# Patient Record
Sex: MALE | NOT HISPANIC OR LATINO | Employment: OTHER | URBAN - METROPOLITAN AREA
[De-identification: names, ages, dates, MRNs, and addresses within clinical notes are randomized per-mention and may not be internally consistent; named-entity substitution may affect disease eponyms.]

---

## 2018-07-06 ENCOUNTER — OFFICE VISIT - HEALTHEAST (OUTPATIENT)
Dept: FAMILY MEDICINE | Facility: CLINIC | Age: 38
End: 2018-07-06

## 2018-07-06 DIAGNOSIS — Z13.1 SCREENING FOR DIABETES MELLITUS: ICD-10-CM

## 2018-07-06 DIAGNOSIS — A04.8 H. PYLORI INFECTION: ICD-10-CM

## 2018-07-06 DIAGNOSIS — K29.80 DUODENITIS: ICD-10-CM

## 2018-07-06 DIAGNOSIS — R10.10 UPPER ABDOMINAL PAIN: ICD-10-CM

## 2018-07-06 DIAGNOSIS — F41.9 ANXIETY: ICD-10-CM

## 2018-07-06 DIAGNOSIS — Z13.220 LIPID SCREENING: ICD-10-CM

## 2018-07-06 LAB
ALBUMIN SERPL-MCNC: 4.3 G/DL (ref 3.5–5)
ALP SERPL-CCNC: 53 U/L (ref 45–120)
ALT SERPL W P-5'-P-CCNC: 23 U/L (ref 0–45)
ANION GAP SERPL CALCULATED.3IONS-SCNC: 5 MMOL/L (ref 5–18)
AST SERPL W P-5'-P-CCNC: 20 U/L (ref 0–40)
BASOPHILS # BLD AUTO: 0 THOU/UL (ref 0–0.2)
BASOPHILS NFR BLD AUTO: 1 % (ref 0–2)
BILIRUB SERPL-MCNC: 0.6 MG/DL (ref 0–1)
BUN SERPL-MCNC: 15 MG/DL (ref 8–22)
CALCIUM SERPL-MCNC: 9.5 MG/DL (ref 8.5–10.5)
CHLORIDE BLD-SCNC: 105 MMOL/L (ref 98–107)
CO2 SERPL-SCNC: 32 MMOL/L (ref 22–31)
CREAT SERPL-MCNC: 0.84 MG/DL (ref 0.7–1.3)
EOSINOPHIL # BLD AUTO: 0.1 THOU/UL (ref 0–0.4)
EOSINOPHIL NFR BLD AUTO: 1 % (ref 0–6)
ERYTHROCYTE [DISTWIDTH] IN BLOOD BY AUTOMATED COUNT: 11.2 % (ref 11–14.5)
GFR SERPL CREATININE-BSD FRML MDRD: >60 ML/MIN/1.73M2
GLUCOSE BLD-MCNC: 100 MG/DL (ref 70–125)
HCT VFR BLD AUTO: 44.3 % (ref 40–54)
HGB BLD-MCNC: 14.6 G/DL (ref 14–18)
LYMPHOCYTES # BLD AUTO: 1.7 THOU/UL (ref 0.8–4.4)
LYMPHOCYTES NFR BLD AUTO: 24 % (ref 20–40)
MCH RBC QN AUTO: 28.6 PG (ref 27–34)
MCHC RBC AUTO-ENTMCNC: 33 G/DL (ref 32–36)
MCV RBC AUTO: 87 FL (ref 80–100)
MONOCYTES # BLD AUTO: 0.3 THOU/UL (ref 0–0.9)
MONOCYTES NFR BLD AUTO: 5 % (ref 2–10)
NEUTROPHILS # BLD AUTO: 4.9 THOU/UL (ref 2–7.7)
NEUTROPHILS NFR BLD AUTO: 70 % (ref 50–70)
PLATELET # BLD AUTO: 150 THOU/UL (ref 140–440)
PMV BLD AUTO: 8.3 FL (ref 7–10)
POTASSIUM BLD-SCNC: 3.7 MMOL/L (ref 3.5–5)
PROT SERPL-MCNC: 7.1 G/DL (ref 6–8)
RBC # BLD AUTO: 5.11 MILL/UL (ref 4.4–6.2)
SODIUM SERPL-SCNC: 142 MMOL/L (ref 136–145)
URATE SERPL-MCNC: 2.7 MG/DL (ref 3–8)
WBC: 7.1 THOU/UL (ref 4–11)

## 2018-07-06 ASSESSMENT — MIFFLIN-ST. JEOR: SCORE: 1763.11

## 2018-07-07 ENCOUNTER — AMBULATORY - HEALTHEAST (OUTPATIENT)
Dept: LAB | Facility: CLINIC | Age: 38
End: 2018-07-07

## 2018-07-07 ENCOUNTER — COMMUNICATION - HEALTHEAST (OUTPATIENT)
Dept: TELEHEALTH | Facility: CLINIC | Age: 38
End: 2018-07-07

## 2018-07-07 DIAGNOSIS — A04.8 H. PYLORI INFECTION: ICD-10-CM

## 2018-07-07 DIAGNOSIS — Z13.220 LIPID SCREENING: ICD-10-CM

## 2018-07-07 LAB
CHOLEST SERPL-MCNC: 150 MG/DL
FASTING STATUS PATIENT QL REPORTED: YES
HDLC SERPL-MCNC: 39 MG/DL
LDLC SERPL CALC-MCNC: 94 MG/DL
TRIGL SERPL-MCNC: 86 MG/DL

## 2018-07-10 LAB
H PYLORI AG STL QL IA: NORMAL
REPORT STATUS: NORMAL
SPECIMEN DESCRIPTION: NORMAL

## 2018-07-12 ENCOUNTER — COMMUNICATION - HEALTHEAST (OUTPATIENT)
Dept: FAMILY MEDICINE | Facility: CLINIC | Age: 38
End: 2018-07-12

## 2018-07-12 ENCOUNTER — OFFICE VISIT - HEALTHEAST (OUTPATIENT)
Dept: FAMILY MEDICINE | Facility: CLINIC | Age: 38
End: 2018-07-12

## 2018-07-12 ENCOUNTER — COMMUNICATION - HEALTHEAST (OUTPATIENT)
Dept: INTERNAL MEDICINE | Facility: CLINIC | Age: 38
End: 2018-07-12

## 2018-07-12 DIAGNOSIS — R10.13 EPIGASTRIC PAIN: ICD-10-CM

## 2018-07-12 DIAGNOSIS — R55 VASO VAGAL EPISODE: ICD-10-CM

## 2018-07-13 ENCOUNTER — OFFICE VISIT - HEALTHEAST (OUTPATIENT)
Dept: FAMILY MEDICINE | Facility: CLINIC | Age: 38
End: 2018-07-13

## 2018-07-13 DIAGNOSIS — R10.9 GASTRIC PAIN: ICD-10-CM

## 2018-07-23 ENCOUNTER — RECORDS - HEALTHEAST (OUTPATIENT)
Dept: ADMINISTRATIVE | Facility: OTHER | Age: 38
End: 2018-07-23

## 2018-07-24 ENCOUNTER — AMBULATORY - HEALTHEAST (OUTPATIENT)
Dept: FAMILY MEDICINE | Facility: CLINIC | Age: 38
End: 2018-07-24

## 2018-07-24 DIAGNOSIS — R10.10 UPPER ABDOMINAL PAIN: ICD-10-CM

## 2018-07-24 DIAGNOSIS — A04.8 H. PYLORI INFECTION: ICD-10-CM

## 2018-07-24 DIAGNOSIS — K29.80 DUODENITIS: ICD-10-CM

## 2018-07-25 ENCOUNTER — OFFICE VISIT - HEALTHEAST (OUTPATIENT)
Dept: FAMILY MEDICINE | Facility: CLINIC | Age: 38
End: 2018-07-25

## 2018-07-25 DIAGNOSIS — R10.10 UPPER ABDOMINAL PAIN: ICD-10-CM

## 2018-07-25 DIAGNOSIS — A04.8 H. PYLORI INFECTION: ICD-10-CM

## 2018-07-25 DIAGNOSIS — K29.80 DUODENITIS: ICD-10-CM

## 2018-07-25 DIAGNOSIS — F41.9 ANXIETY: ICD-10-CM

## 2019-04-22 ENCOUNTER — HOSPITAL ENCOUNTER (EMERGENCY)
Facility: CLINIC | Age: 39
Discharge: HOME | End: 2019-04-23
Attending: EMERGENCY MEDICINE | Admitting: EMERGENCY MEDICINE
Payer: MEDICAID

## 2019-04-22 ENCOUNTER — APPOINTMENT (OUTPATIENT)
Dept: ULTRASOUND IMAGING | Facility: CLINIC | Age: 39
End: 2019-04-22
Attending: EMERGENCY MEDICINE
Payer: MEDICAID

## 2019-04-22 DIAGNOSIS — R10.13 ABDOMINAL PAIN, EPIGASTRIC: ICD-10-CM

## 2019-04-22 DIAGNOSIS — R11.0 NAUSEA: ICD-10-CM

## 2019-04-22 LAB
ALBUMIN SERPL-MCNC: 3.6 G/DL (ref 3.4–5)
ALP SERPL-CCNC: 59 U/L (ref 40–150)
ALT SERPL W P-5'-P-CCNC: 52 U/L (ref 0–70)
ANION GAP SERPL CALCULATED.3IONS-SCNC: 4 MMOL/L (ref 3–14)
AST SERPL W P-5'-P-CCNC: 25 U/L (ref 0–45)
BASOPHILS # BLD AUTO: 0 10E9/L (ref 0–0.2)
BASOPHILS NFR BLD AUTO: 0.6 %
BILIRUB DIRECT SERPL-MCNC: 0.1 MG/DL (ref 0–0.2)
BILIRUB SERPL-MCNC: 0.4 MG/DL (ref 0.2–1.3)
BUN SERPL-MCNC: 13 MG/DL (ref 7–30)
CALCIUM SERPL-MCNC: 8.7 MG/DL (ref 8.5–10.1)
CHLORIDE SERPL-SCNC: 102 MMOL/L (ref 94–109)
CO2 SERPL-SCNC: 30 MMOL/L (ref 20–32)
CREAT SERPL-MCNC: 0.93 MG/DL (ref 0.66–1.25)
DIFFERENTIAL METHOD BLD: NORMAL
EOSINOPHIL # BLD AUTO: 0.1 10E9/L (ref 0–0.7)
EOSINOPHIL NFR BLD AUTO: 1 %
ERYTHROCYTE [DISTWIDTH] IN BLOOD BY AUTOMATED COUNT: 12.7 % (ref 10–15)
GFR SERPL CREATININE-BSD FRML MDRD: >90 ML/MIN/{1.73_M2}
GLUCOSE SERPL-MCNC: 105 MG/DL (ref 70–99)
HCT VFR BLD AUTO: 42.3 % (ref 40–53)
HGB BLD-MCNC: 14.1 G/DL (ref 13.3–17.7)
IMM GRANULOCYTES # BLD: 0 10E9/L (ref 0–0.4)
IMM GRANULOCYTES NFR BLD: 0.3 %
LIPASE SERPL-CCNC: 98 U/L (ref 73–393)
LYMPHOCYTES # BLD AUTO: 2.1 10E9/L (ref 0.8–5.3)
LYMPHOCYTES NFR BLD AUTO: 31.3 %
MCH RBC QN AUTO: 29 PG (ref 26.5–33)
MCHC RBC AUTO-ENTMCNC: 33.3 G/DL (ref 31.5–36.5)
MCV RBC AUTO: 87 FL (ref 78–100)
MONOCYTES # BLD AUTO: 0.6 10E9/L (ref 0–1.3)
MONOCYTES NFR BLD AUTO: 8.1 %
NEUTROPHILS # BLD AUTO: 4 10E9/L (ref 1.6–8.3)
NEUTROPHILS NFR BLD AUTO: 58.7 %
NRBC # BLD AUTO: 0 10*3/UL
NRBC BLD AUTO-RTO: 0 /100
PLATELET # BLD AUTO: 158 10E9/L (ref 150–450)
POTASSIUM SERPL-SCNC: 3.3 MMOL/L (ref 3.4–5.3)
PROT SERPL-MCNC: 7 G/DL (ref 6.8–8.8)
RBC # BLD AUTO: 4.86 10E12/L (ref 4.4–5.9)
SODIUM SERPL-SCNC: 137 MMOL/L (ref 133–144)
TSH SERPL DL<=0.005 MIU/L-ACNC: 3.29 MU/L (ref 0.4–4)
WBC # BLD AUTO: 6.8 10E9/L (ref 4–11)

## 2019-04-22 PROCEDURE — 80048 BASIC METABOLIC PNL TOTAL CA: CPT | Performed by: EMERGENCY MEDICINE

## 2019-04-22 PROCEDURE — 83690 ASSAY OF LIPASE: CPT | Performed by: EMERGENCY MEDICINE

## 2019-04-22 PROCEDURE — 80076 HEPATIC FUNCTION PANEL: CPT | Performed by: EMERGENCY MEDICINE

## 2019-04-22 PROCEDURE — 93005 ELECTROCARDIOGRAM TRACING: CPT | Performed by: EMERGENCY MEDICINE

## 2019-04-22 PROCEDURE — 96375 TX/PRO/DX INJ NEW DRUG ADDON: CPT | Performed by: EMERGENCY MEDICINE

## 2019-04-22 PROCEDURE — 76705 ECHO EXAM OF ABDOMEN: CPT

## 2019-04-22 PROCEDURE — 99284 EMERGENCY DEPT VISIT MOD MDM: CPT | Mod: 25 | Performed by: EMERGENCY MEDICINE

## 2019-04-22 PROCEDURE — 84443 ASSAY THYROID STIM HORMONE: CPT | Performed by: EMERGENCY MEDICINE

## 2019-04-22 PROCEDURE — 96365 THER/PROPH/DIAG IV INF INIT: CPT | Performed by: EMERGENCY MEDICINE

## 2019-04-22 PROCEDURE — 93010 ELECTROCARDIOGRAM REPORT: CPT | Mod: Z6 | Performed by: EMERGENCY MEDICINE

## 2019-04-22 PROCEDURE — 25000128 H RX IP 250 OP 636: Performed by: EMERGENCY MEDICINE

## 2019-04-22 PROCEDURE — 85025 COMPLETE CBC W/AUTO DIFF WBC: CPT | Performed by: EMERGENCY MEDICINE

## 2019-04-22 PROCEDURE — 96361 HYDRATE IV INFUSION ADD-ON: CPT | Performed by: EMERGENCY MEDICINE

## 2019-04-22 RX ORDER — METOCLOPRAMIDE HYDROCHLORIDE 5 MG/ML
5 INJECTION INTRAMUSCULAR; INTRAVENOUS ONCE
Status: COMPLETED | OUTPATIENT
Start: 2019-04-22 | End: 2019-04-22

## 2019-04-22 RX ORDER — ALPRAZOLAM 0.5 MG
0.5 TABLET ORAL 3 TIMES DAILY PRN
COMMUNITY
End: 2019-06-15

## 2019-04-22 RX ORDER — ACETAMINOPHEN 500 MG
500-1000 TABLET ORAL EVERY 6 HOURS PRN
COMMUNITY
End: 2019-06-15

## 2019-04-22 RX ADMIN — METOCLOPRAMIDE 5 MG: 5 INJECTION, SOLUTION INTRAMUSCULAR; INTRAVENOUS at 22:47

## 2019-04-22 RX ADMIN — FAMOTIDINE 20 MG: 20 INJECTION, SOLUTION INTRAVENOUS at 22:53

## 2019-04-22 RX ADMIN — SODIUM CHLORIDE 1000 ML: 9 INJECTION, SOLUTION INTRAVENOUS at 22:50

## 2019-04-22 SDOH — HEALTH STABILITY: MENTAL HEALTH: HOW OFTEN DO YOU HAVE A DRINK CONTAINING ALCOHOL?: NEVER

## 2019-04-22 NOTE — ED AVS SNAPSHOT
[facility], [place], Emergency Department  [address] [place] [id number]  Phone:  [phone]                                    Lon Williamson   MRN: [id number]    Department:  [facility], Emergency Department   Date of Visit:  4/22/2019           After Visit Summary Signature Page    I have received my discharge instructions, and my questions have been answered. I have discussed any challenges I see with this plan with the nurse or doctor.    ..........................................................................................................................................  Patient/Patient Representative Signature      ..........................................................................................................................................  Patient Representative Print Name and Relationship to Patient    ..................................................               ................................................  Date                                   Time    ..........................................................................................................................................  Reviewed by Signature/Title    ...................................................              ..............................................  Date                                               Time          22EPIC Rev 08/18

## 2019-04-23 VITALS
TEMPERATURE: 98.3 F | SYSTOLIC BLOOD PRESSURE: 126 MMHG | OXYGEN SATURATION: 98 % | RESPIRATION RATE: 18 BRPM | DIASTOLIC BLOOD PRESSURE: 81 MMHG | HEIGHT: 74 IN

## 2019-04-23 LAB — INTERPRETATION ECG - MUSE: NORMAL

## 2019-04-23 RX ORDER — IBUPROFEN 600 MG/1
600 TABLET, FILM COATED ORAL EVERY 6 HOURS PRN
Qty: 30 TABLET | Refills: 0 | Status: SHIPPED | OUTPATIENT
Start: 2019-04-23

## 2019-04-23 RX ORDER — PROCHLORPERAZINE MALEATE 10 MG
10 TABLET ORAL EVERY 6 HOURS PRN
Qty: 20 TABLET | Refills: 0 | Status: SHIPPED | OUTPATIENT
Start: 2019-04-23 | End: 2019-06-15

## 2019-04-23 NOTE — DISCHARGE INSTRUCTIONS
Please follow-up in your clinic in the next 1 to 2 weeks for follow-up of your abdominal discomfort.  Please take medications for your headache as well.  If your headache feels like it is getting worse with significant pain that wakes you up, new fevers please return to the emergency department for additional evaluation.

## 2019-04-23 NOTE — ED TRIAGE NOTES
"Pt c/o abd pain with nausea all the time. Also c/o headache that \"is hot and cold, in the back of my head.\" Hx of H. Pylori. Pt wife states that he was confused earlier today, not confused during triage.   "

## 2019-04-23 NOTE — ED PROVIDER NOTES
"  History     Chief Complaint   Patient presents with     Abdominal Pain     Nausea     HPI  Lon Williamson is a 38 year old male with a history of H. Pylori and acid reflux who presents for evaluation of nausea, a headache, and dizziness. Per patient report, he has had intermittent nausea for the past 2 months, triggered either before or after eating, drinking, or with certain smells. He states that \"60% of the time\", his nausea leads to vomiting as well and complains of pain in his upper right quadrant and generalized distention.     In addition, he states he's had a bilateral and posterior headache for the past 2 weeks, as well as some occasional neck and eye pain. Patient reports the headache is present every morning and becomes worse throughout the day. He notes his headache is exacerbated secondary to pressure and stress at work.     Patient further reports onset of dizziness, chills, and a subjective fever today. He denies loss of consciousness but states his vision is occasionally blurred as a result of his dizziness.    Patient states his nausea is more severe than his prior episode of H.Pylori. He reports he's been treating his symptoms with omeprazole and aspirin (last taken 2 hours PTA).  Denies diarrhea or history of abdominal surgery.      Past Medical History:   Diagnosis Date     Anxiety      Chronic kidney disease     H.Pylori       History reviewed. No pertinent surgical history.    History reviewed. No pertinent family history.    Social History     Tobacco Use     Smoking status: Former Smoker     Last attempt to quit: 4/22/2016     Years since quitting: 3.0     Smokeless tobacco: Never Used   Substance Use Topics     Alcohol use: Never     Frequency: Never       No current facility-administered medications for this encounter.      Current Outpatient Medications   Medication     acetaminophen (TYLENOL) 500 MG tablet     ALPRAZolam (XANAX) 0.5 MG tablet      No Known Allergies    I have " "reviewed the Medications, Allergies, Past Medical and Surgical History, and Social History in the Epic system.    Review of Systems  A complete ROS was completed with pertinent positives and negatives noted in HPI; otherwise negative.     Physical Exam   BP: 126/81  Heart Rate: 95  Temp: 98.3  F (36.8  C)  Resp: 18  Height: 187 cm (6' 1.62\")    Physical Exam  Gen: Alert, oriented. Non-toxic appearing.   HEENT: Normocephalic. Conjunctivae without injection. No scleral icterus.   CV: RRR, no clear murmur appreciated  Peripheral vascular: No significant edema. Warm extremities.   Respiratory: Non-labored breathing on RA. No wheezing or stridor appreciated.   Abdomen/GI: Soft, mild epigastric right upper quadrant tenderness in the periumbilical pain.  No focal right lower quadrant pain.  No suprapubic pain or distention.  No rebound tenderness appreciated.   : No CVA tenderness.   MSK: No gross bony deformity.   Heme/lymph: No ecchymoses noted.   Neuro: Alert, oriented. CN 2-12 symmetric and intact.  No pronator drift.  Finger-nose-finger intact.  35 strength with shoulder abduction, elbow flexion extension,  strength, wrist flexion extension, hip flexion, knee flexion extension, plantar dorsiflexion.  Sensation light touch intact to the face, forearm and lower extremity -  symmetric.  Psych: No delusions or agitation.     ED Course        Procedures       10:05 PM  The patient was seen and examined by Dr. Moody in Room 11.     US Abdomen Limited   Preliminary Result   Impression:    Normal abdominal ultrasound.             EKG Interpretation:      Interpreted by Ted Moody  Time reviewed:1105pm   Symptoms at time of EKG: Chest pain   Rhythm: normal sinus   Rate: normal  Axis: NORMAL  Ectopy: none  Conduction: normal  ST Segments/ T Waves: No ST-T wave changes  Q Waves: none  Comparison to prior: No old EKG available    Clinical Impression: normal EKG      US Abdomen Limited   Preliminary Result "   Impression:    Normal abdominal ultrasound.            Labs Ordered and Resulted from Time of ED Arrival Up to the Time of Departure from the ED   BASIC METABOLIC PANEL - Abnormal; Notable for the following components:       Result Value    Potassium 3.3 (*)     Glucose 105 (*)     All other components within normal limits   CBC WITH PLATELETS DIFFERENTIAL   TSH WITH FREE T4 REFLEX   HEPATIC PANEL   LIPASE            Assessments & Plan (with Medical Decision Making)   Lon Williamson is a 38 year old M with medical history significant for gastritis, anxiety presented for evaluation of headache and abdominal discomfort, nausea.  Differential considered included gastritis, PUD, hepatitis, pancreatitis,, inflammatory bowel disease, tension type headache, stress reaction, thyroid disease among others.. I reviewed nursing notes and patient's vitals on arrival. Examination was significant for well-appearing male in no acute distress with benign abdominal exam but multiple areas of tenderness to palpation most notably in the epigastric and right upper quadrant.  Labs are collected notable for normal BMP with mild hypokalemia, normal LFTs and lipase.  TSH is within normal limits.  CBC with normal white count normal hemoglobin.  Ultrasound was performed and did not show any evidence of acute pathology in the right upper quadrant.  Patient reported feeling his nausea improved after the bolus, Pepcid and Reglan.  He does note some ongoing occipital headache.  EKG was performed given patient reported lightheadedness.  This is negative for any acute pathology.  Given his normal neuro exam and symptoms that seem to correspond with increasing stress at work, CT imaging of the head was not completed today.  Asked him that if his symptoms did continue or worsened, he should return to the emergency department.  Otherwise asked him to follow-up this week or next for repeat evaluation in clinic to ensure gradual improvement in  epigastric pain and headache.  Patient agreement with plan.  Will be discharged with Compazine and ibuprofen for symptoms as needed.  All patient's questions were answered discharge with close follow-up planned.        Medication List      Started    ibuprofen 600 MG tablet  Commonly known as:  ADVIL/MOTRIN  600 mg, Oral, EVERY 6 HOURS PRN     prochlorperazine 10 MG tablet  Commonly known as:  COMPAZINE  10 mg, Oral, EVERY 6 HOURS PRN            Final diagnoses:   Abdominal pain, epigastric   Nausea   IBhaskar, am serving as a trained medical scribe to document services personally performed by Ted Moody MD, based on the provider's statements to me.   ITed MD, was physically present and have reviewed and verified the accuracy of this note documented by Bhaskar Bennett.    4/22/2019   [facility], [place], EMERGENCY DEPARTMENT     Ted Moody MD  04/23/19 0110

## 2019-06-15 ENCOUNTER — HOSPITAL ENCOUNTER (EMERGENCY)
Facility: CLINIC | Age: 39
Discharge: HOME | End: 2019-06-16
Attending: EMERGENCY MEDICINE | Admitting: EMERGENCY MEDICINE
Payer: COMMERCIAL

## 2019-06-15 ENCOUNTER — APPOINTMENT (OUTPATIENT)
Dept: GENERAL RADIOLOGY | Facility: CLINIC | Age: 39
End: 2019-06-15
Attending: EMERGENCY MEDICINE
Payer: COMMERCIAL

## 2019-06-15 ENCOUNTER — APPOINTMENT (OUTPATIENT)
Dept: CT IMAGING | Facility: CLINIC | Age: 39
End: 2019-06-15
Attending: EMERGENCY MEDICINE
Payer: COMMERCIAL

## 2019-06-15 DIAGNOSIS — R20.2 PARESTHESIAS: ICD-10-CM

## 2019-06-15 DIAGNOSIS — F41.9 ANXIETY: ICD-10-CM

## 2019-06-15 DIAGNOSIS — R20.0 NUMBNESS AND TINGLING: ICD-10-CM

## 2019-06-15 DIAGNOSIS — R42 DIZZINESS: ICD-10-CM

## 2019-06-15 DIAGNOSIS — R20.2 NUMBNESS AND TINGLING: ICD-10-CM

## 2019-06-15 LAB
ALBUMIN SERPL-MCNC: 4.1 G/DL (ref 3.4–5)
ALP SERPL-CCNC: 63 U/L (ref 40–150)
ALT SERPL W P-5'-P-CCNC: 46 U/L (ref 0–70)
ANION GAP SERPL CALCULATED.3IONS-SCNC: 5 MMOL/L (ref 3–14)
AST SERPL W P-5'-P-CCNC: 25 U/L (ref 0–45)
BASOPHILS # BLD AUTO: 0.1 10E9/L (ref 0–0.2)
BASOPHILS NFR BLD AUTO: 0.7 %
BILIRUB SERPL-MCNC: 0.3 MG/DL (ref 0.2–1.3)
BUN SERPL-MCNC: 22 MG/DL (ref 7–30)
CALCIUM SERPL-MCNC: 9.1 MG/DL (ref 8.5–10.1)
CHLORIDE SERPL-SCNC: 102 MMOL/L (ref 94–109)
CO2 SERPL-SCNC: 32 MMOL/L (ref 20–32)
CREAT SERPL-MCNC: 1.07 MG/DL (ref 0.66–1.25)
DIFFERENTIAL METHOD BLD: NORMAL
EOSINOPHIL # BLD AUTO: 0.1 10E9/L (ref 0–0.7)
EOSINOPHIL NFR BLD AUTO: 1 %
ERYTHROCYTE [DISTWIDTH] IN BLOOD BY AUTOMATED COUNT: 12.3 % (ref 10–15)
GFR SERPL CREATININE-BSD FRML MDRD: 87 ML/MIN/{1.73_M2}
GLUCOSE SERPL-MCNC: 88 MG/DL (ref 70–99)
HCT VFR BLD AUTO: 45.6 % (ref 40–53)
HGB BLD-MCNC: 14.9 G/DL (ref 13.3–17.7)
IMM GRANULOCYTES # BLD: 0 10E9/L (ref 0–0.4)
IMM GRANULOCYTES NFR BLD: 0.2 %
LIPASE SERPL-CCNC: 93 U/L (ref 73–393)
LYMPHOCYTES # BLD AUTO: 2.7 10E9/L (ref 0.8–5.3)
LYMPHOCYTES NFR BLD AUTO: 33.2 %
MCH RBC QN AUTO: 28.8 PG (ref 26.5–33)
MCHC RBC AUTO-ENTMCNC: 32.7 G/DL (ref 31.5–36.5)
MCV RBC AUTO: 88 FL (ref 78–100)
MONOCYTES # BLD AUTO: 0.7 10E9/L (ref 0–1.3)
MONOCYTES NFR BLD AUTO: 8.5 %
NEUTROPHILS # BLD AUTO: 4.6 10E9/L (ref 1.6–8.3)
NEUTROPHILS NFR BLD AUTO: 56.4 %
NRBC # BLD AUTO: 0 10*3/UL
NRBC BLD AUTO-RTO: 0 /100
PLATELET # BLD AUTO: 181 10E9/L (ref 150–450)
POTASSIUM SERPL-SCNC: 3.7 MMOL/L (ref 3.4–5.3)
PROT SERPL-MCNC: 7.9 G/DL (ref 6.8–8.8)
RBC # BLD AUTO: 5.18 10E12/L (ref 4.4–5.9)
SODIUM SERPL-SCNC: 139 MMOL/L (ref 133–144)
WBC # BLD AUTO: 8.2 10E9/L (ref 4–11)

## 2019-06-15 PROCEDURE — 83690 ASSAY OF LIPASE: CPT | Performed by: EMERGENCY MEDICINE

## 2019-06-15 PROCEDURE — 70450 CT HEAD/BRAIN W/O DYE: CPT

## 2019-06-15 PROCEDURE — 99285 EMERGENCY DEPT VISIT HI MDM: CPT | Mod: 25 | Performed by: EMERGENCY MEDICINE

## 2019-06-15 PROCEDURE — 96361 HYDRATE IV INFUSION ADD-ON: CPT | Performed by: EMERGENCY MEDICINE

## 2019-06-15 PROCEDURE — 71045 X-RAY EXAM CHEST 1 VIEW: CPT

## 2019-06-15 PROCEDURE — 93010 ELECTROCARDIOGRAM REPORT: CPT | Mod: Z6 | Performed by: EMERGENCY MEDICINE

## 2019-06-15 PROCEDURE — 85025 COMPLETE CBC W/AUTO DIFF WBC: CPT | Performed by: EMERGENCY MEDICINE

## 2019-06-15 PROCEDURE — 25000128 H RX IP 250 OP 636: Performed by: EMERGENCY MEDICINE

## 2019-06-15 PROCEDURE — 80053 COMPREHEN METABOLIC PANEL: CPT | Performed by: EMERGENCY MEDICINE

## 2019-06-15 PROCEDURE — 93005 ELECTROCARDIOGRAM TRACING: CPT | Performed by: EMERGENCY MEDICINE

## 2019-06-15 RX ORDER — SODIUM CHLORIDE 9 MG/ML
1000 INJECTION, SOLUTION INTRAVENOUS CONTINUOUS
Status: DISCONTINUED | OUTPATIENT
Start: 2019-06-15 | End: 2019-06-16 | Stop reason: HOSPADM

## 2019-06-15 RX ADMIN — SODIUM CHLORIDE 1000 ML: 9 INJECTION, SOLUTION INTRAVENOUS at 22:55

## 2019-06-15 ASSESSMENT — MIFFLIN-ST. JEOR: SCORE: 1802.03

## 2019-06-15 ASSESSMENT — ENCOUNTER SYMPTOMS
HEMATURIA: 0
HEADACHES: 0
FREQUENCY: 0
ABDOMINAL PAIN: 1
DECREASED CONCENTRATION: 1
NAUSEA: 1
ABDOMINAL DISTENTION: 1
DYSURIA: 0
NUMBNESS: 1

## 2019-06-15 NOTE — ED AVS SNAPSHOT
[facility], [place], Emergency Department  [address] [place] [id number]  Phone:  [phone]                                    Lon Williamson   MRN: [id number]    Department:  [facility], Emergency Department   Date of Visit:  6/15/2019           After Visit Summary Signature Page    I have received my discharge instructions, and my questions have been answered. I have discussed any challenges I see with this plan with the nurse or doctor.    ..........................................................................................................................................  Patient/Patient Representative Signature      ..........................................................................................................................................  Patient Representative Print Name and Relationship to Patient    ..................................................               ................................................  Date                                   Time    ..........................................................................................................................................  Reviewed by Signature/Title    ...................................................              ..............................................  Date                                               Time          22EPIC Rev 08/18

## 2019-06-16 ENCOUNTER — APPOINTMENT (OUTPATIENT)
Dept: MRI IMAGING | Facility: CLINIC | Age: 39
End: 2019-06-16
Attending: EMERGENCY MEDICINE
Payer: COMMERCIAL

## 2019-06-16 VITALS
WEIGHT: 181.5 LBS | DIASTOLIC BLOOD PRESSURE: 94 MMHG | SYSTOLIC BLOOD PRESSURE: 127 MMHG | OXYGEN SATURATION: 98 % | TEMPERATURE: 98.7 F | BODY MASS INDEX: 23.29 KG/M2 | HEART RATE: 99 BPM | HEIGHT: 74 IN | RESPIRATION RATE: 16 BRPM

## 2019-06-16 LAB — RADIOLOGIST FLAGS: ABNORMAL

## 2019-06-16 PROCEDURE — 25000128 H RX IP 250 OP 636: Performed by: EMERGENCY MEDICINE

## 2019-06-16 PROCEDURE — 70551 MRI BRAIN STEM W/O DYE: CPT

## 2019-06-16 PROCEDURE — 96374 THER/PROPH/DIAG INJ IV PUSH: CPT | Performed by: EMERGENCY MEDICINE

## 2019-06-16 RX ORDER — LORAZEPAM 2 MG/ML
0.5 INJECTION INTRAMUSCULAR ONCE
Status: COMPLETED | OUTPATIENT
Start: 2019-06-16 | End: 2019-06-16

## 2019-06-16 RX ADMIN — LORAZEPAM 0.5 MG: 2 INJECTION, SOLUTION INTRAMUSCULAR; INTRAVENOUS at 00:31

## 2019-06-16 NOTE — ED TRIAGE NOTES
Patient presents ambulatory to triage with c/o numbness. Patient reports nausea, pressure in head (denies HA), numbness in fingers (L>R), dizziness, confusion, and difficulty breathing. Denies abdominal pain and vomiting.

## 2019-06-16 NOTE — ED NOTES
Sign Out Provider: Dr. Lugo    Sign Out Plan: Patient is a 39 yo male who presents with difficulty concentrating, dizziness, and paresthesia. CT with small hypodense lesion in the area of the left basal ganglia which could represent an old lacunar infarct or possibly a small cyst.  Patient is pending an MRI and will likely do be discharged home with outpatient follow-up.    Reassessment: MRI unremarkable with No evidence of acute infarction or intracranial hemorrhage. Notemporal lobe abnormalities. Patient feeling better and comfortable with discharge home and outpatient follow-up.  No focal neurological deficit.      Disposition: Discharge home with close outpatient follow up with primary care provider.  Return precautions discussed.       Sylvia Reddy MD  06/16/19 0146

## 2019-06-16 NOTE — DISCHARGE INSTRUCTIONS
It is very important that you follow-up with your primary care doctor to make sure that you are doing better and to see if any other tests or treatments will be needed.  If fevers nausea vomiting or symptoms or if any other concerns develop please return to emergency department      Please make an appointment to follow up with [facility] (phone: [phone] as soon as possible even if entirely better.

## 2019-06-16 NOTE — ED PROVIDER NOTES
"  History     Chief Complaint   Patient presents with     Numbness     HPI  Lon Williamson is a 38 year old male with a history of anxiety and H. Pylori who presents for evaluation of nausea and trouble concentrating. He reports he struggled with nausea, abdominal pain, and distention 1 year ago and was diagnosed with H. Pylori. Patient states he had been treated with antibiotics and an endoscopy 2 months ago had determined he was healthy. However, patient reports he has still continued to feel abdominal pain/distention and nausea, especially after eating beef. In addition, for the past 2 weeks, he states he has had difficulty concentrating, a \"heaviness\" in his head, and numbness in his fingers. Patient denies a headache, change in urination, or history of abdominal surgery. He is not regularly medicated.    Past Medical History:   Diagnosis Date     Anxiety      Chronic kidney disease     H.Pylori       No past surgical history on file.    No family history on file.    Social History     Tobacco Use     Smoking status: Former Smoker     Last attempt to quit: 4/22/2016     Years since quitting: 3.1     Smokeless tobacco: Never Used   Substance Use Topics     Alcohol use: Never     Frequency: Never       No current facility-administered medications for this encounter.      Current Outpatient Medications   Medication     ibuprofen (ADVIL/MOTRIN) 600 MG tablet      No Known Allergies    I have reviewed the Medications, Allergies, Past Medical and Surgical History, and Social History in the Epic system.    Review of Systems   Gastrointestinal: Positive for abdominal distention, abdominal pain and nausea.   Genitourinary: Negative for dysuria, frequency, hematuria and urgency.   Neurological: Positive for numbness (fingers). Negative for headaches.   Psychiatric/Behavioral: Positive for decreased concentration.   All other systems reviewed and are negative.      Physical Exam   BP: (!) 145/93  Pulse: 105  Heart Rate: " "109  Temp: 98.7  F (37.1  C)  Resp: 16  Height: 187 cm (6' 1.62\")  Weight: 82.3 kg (181 lb 8 oz)(scale)  SpO2: 100 %      Physical Exam   Constitutional: He is oriented to person, place, and time. He appears well-developed and well-nourished. No distress.   HENT:   Head: Normocephalic and atraumatic.   Eyes: Pupils are equal, round, and reactive to light. Conjunctivae and EOM are normal.   Neck: Normal range of motion. Neck supple.   Cardiovascular: Normal rate and regular rhythm. Exam reveals no gallop and no friction rub.   No murmur heard.  Pulmonary/Chest: Effort normal and breath sounds normal. No respiratory distress.   Abdominal: Soft. Bowel sounds are normal. He exhibits no distension and no mass. There is no tenderness. There is no guarding.   Musculoskeletal: He exhibits no edema or tenderness.   Neurological: He is alert and oriented to person, place, and time. No cranial nerve deficit.   Skin: Skin is warm.   Psychiatric: He has a normal mood and affect. His behavior is normal. Judgment and thought content normal.   Nursing note and vitals reviewed.      ED Course   Physical exam is reassuring based on the history and physical examination I plan to screen him, however there is no significant pathology is on as long the screening work-up is negative I plan to discharge this patient with follow-up     Procedures  None     10:39 PM  The patient was seen and examined by Dr. Lugo in Room 21.          EKG Interpretation:      Interpreted by Alli Lugo  Time reviewed: 2256 hrs.  Symptoms at time of EK bpm  Rhythm: normal sinus   Rate: 90 bpm  Axis: Normal  Ectopy: none  Conduction: normal  ST Segments/ T Waves: No ST-T wave changes  Q Waves: none  Comparison to prior: Unchanged from 2019    Clinical Impression: normal EKG                Critical Care time:  none             Labs Ordered and Resulted from Time of ED Arrival Up to the Time of Departure from the ED   CBC WITH PLATELETS " DIFFERENTIAL   COMPREHENSIVE METABOLIC PANEL   LIPASE            Assessments & Plan (with Medical Decision Making)   This is a 38-year-old male without any significant past medical history that comes in for evaluation of myalgias dizziness, paresthesias, episode of abdominal pain that has now resolved and nausea.  Patient says that lately he has been having decreased concentration he wants to be evaluated based on history and physical examination there was no evidence of acute pathology and he is physical examination is rather reassuring.  EKG done in this patient revealed no evidence of acute changes and he was compared to an old EKG done on the 22nd April 2019.  Based on the history and physical examination I plan to screen him for any evidence of pathology and will likely disposition home with follow-up    I have reviewed the nursing notes.    I have reviewed the findings, diagnosis, plan and need for follow up with the patient.       Medication List      There are no discharge medications for this visit.         Final diagnoses:   Paresthesias   Dizziness   Anxiety   Numbness and tingling   IBhaskar, am serving as a trained medical scribe to document services personally performed by Alli Lugo MD, based on the provider's statements to me.   IAlli MD, was physically present and have reviewed and verified the accuracy of this note documented by Bhaskar Bennett.    6/15/2019   [facility], [place], EMERGENCY DEPARTMENT     Alli Lugo MD  06/23/19 0942

## 2019-06-17 LAB — INTERPRETATION ECG - MUSE: NORMAL

## 2021-06-01 VITALS — WEIGHT: 176.5 LBS | BODY MASS INDEX: 23.29 KG/M2

## 2021-06-01 VITALS — WEIGHT: 174.3 LBS | BODY MASS INDEX: 23 KG/M2

## 2021-06-01 VITALS — BODY MASS INDEX: 23.5 KG/M2 | HEIGHT: 73 IN | WEIGHT: 177.3 LBS

## 2021-06-01 VITALS — WEIGHT: 175.6 LBS | BODY MASS INDEX: 23.17 KG/M2

## 2021-06-19 NOTE — PROGRESS NOTES
Assessment/Plan:        1. Anxiety  Treatment options were reviewed   Plan:   Start:   - citalopram (CELEXA) 20 MG tablet; Take 1 tablet (20 mg total) by mouth daily.  Dispense: 30 tablet; Refill: 0  follow up in a month   Sooner with any worsening sx.       2. Upper abdominal pain  Consider gastritis secondary to anxiety   R/o reinfection with Hpylori  H/o Erosive Duodenitis     Plan:     - pantoprazole (PROTONIX) 40 MG tablet; Take 1 tablet (40 mg total) by mouth daily.  Dispense: 30 tablet; Refill: 1    - H. pylori Antigen, Stool(HPSAG); Future  - Ambulatory Referral for Upper GI Endoscopy      4. Lipid screening  - Lipid Profile; Future      5. Screening for diabetes mellitus    - HM1(CBC and Differential)  - Comprehensive Metabolic Panel  - Uric Acid  - HM1 (CBC with Diff)          Subjective:    Patient ID:   Kayla Williamson is a 37 y.o. male here to establish care and presenting with multiple concerns:     1. Anxiety   Feeling anxious affecting daily life.   Not on any medication.      2. Upper abdominal pain   Not sure if due to anxiety   H/o tx for H pylori infection and erosive duodenitis   Pain feels the same as past H pylori infection and want this retested.   Currently on On reglan and zantac             Review of Systems  Constitutional: negative  Eyes: negative  ENT and face: negative  Respiratory: negative  CV: negative  GI: see HPi   : neg   Skin: negative   Musculoskeletal:negative  Neuro/ neg  psych: see HPI      The following patient's history were reviewed and updated as appropriate:   He  has no past medical history on file.  He  does not have a problem list on file.  He  has a past surgical history that includes No past surgeries.  His family history includes Anxiety disorder in his mother; No Medical Problems in his brother, father, and sister.  He  reports that he has quit smoking. His smoking use included Cigarettes. He has never used smokeless tobacco. He reports that he does not drink  "alcohol. His drug history is not on file..      Outpatient Encounter Prescriptions as of 7/6/2018   Medication Sig Dispense Refill     metoclopramide (REGLAN) 10 MG tablet Take 10 mg by mouth 4 (four) times a day.       ranitidine (ZANTAC) 150 MG tablet Take 150 mg by mouth 2 (two) times a day.       No facility-administered encounter medications on file as of 7/6/2018.          Objective:   /66 (Patient Site: Right Arm, Patient Position: Sitting, Cuff Size: Adult Regular)  Pulse 97  Temp 98.4  F (36.9  C) (Oral)   Ht 6' 1\" (1.854 m)  Wt 177 lb 4.8 oz (80.4 kg)  SpO2 98%  BMI 23.39 kg/m2      Physical Exam  General Appearance:    Alert,  no acute distress, well hydrated    Eyes:    PERRL, conjunctiva/corneas clear, non icterus    Throat:   Lips, mucosa, and tongue normal;  gums normal   Neck:   Supple, symmetrical, trachea midline, no adenopathy;        thyroid:  No enlargement/tenderness/nodules; no carotid    bruit or JVD   Lungs:     Clear to auscultation bilaterally, respirations unlabored   Heart:    Regular rate and rhythm, S1 and S2 normal, no murmur, rub   or gallop   Abdomen:      Soft, palpable tenderness to the epigastric area , normal bowel sounds, no rebound or guarding, no masses, no organomegaly   Extremities:   Extremities normal, atraumatic, no cyanosis or edema   Skin:   Skin color, texture, turgor normal, no rashes or lesions      "

## 2021-06-19 NOTE — PROGRESS NOTES
Assessment/Plan:        1. Anxiety prior to procedure   Discussed treating with a dose of :   - ALPRAZolam (XANAX) 0.5 MG tablet prior to the procedure. May take up to 1 mg.     2. Upper abdominal pain  Refill:   - pantoprazole (PROTONIX) 40 MG tablet; Take 1 tablet (40 mg total) by mouth daily.  Dispense: 90 tablet; Refill: 2    3. H. pylori infection  H/o, with neg confirmation recently     4. Erosive Duodenitis  H/o   Pending EGD              Subjective:    Patient ID:   Lon Williamson is a 37 y.o. male comes in follow up today and accompanied by his wife.   He reports of having issues with going through with the EGD, making him extremely nervous, and had to postpone the procedure.     Of note, he has a h/o deudenitis and recently tested negative for the H-Pylori.        Review of Systems  A complete 5 point review of systems was obtained and is negative other than what is stated in the HPI.            Outpatient Encounter Prescriptions as of 7/25/2018   Medication Sig Dispense Refill     pantoprazole (PROTONIX) 40 MG tablet Take 1 tablet (40 mg total) by mouth daily. 90 tablet 2     No facility-administered encounter medications on file as of 7/25/2018.          Objective:   /56 (Patient Site: Right Arm, Patient Position: Sitting, Cuff Size: Adult Regular)  Pulse 84  Temp 98  F (36.7  C) (Oral)   Wt 174 lb 4.8 oz (79.1 kg)  SpO2 96%  BMI 23 kg/m2      Physical Exam  General Appearance:    Alert,  no acute distress, well hydrated    Lungs:     Clear to auscultation bilaterally, respirations unlabored   Heart:    Regular rate and rhythm, S1 and S2 normal, no murmur, rub   or gallop   Abdomen:      Soft, mild palpable tenderness to the epigastric area , normal bowel sounds, no rebound or guarding, no masses, no organomegaly   Skin:   Skin color, texture, turgor normal, no rashes or lesions

## 2021-06-19 NOTE — PROGRESS NOTES
Assessment/Plan:        1. Gastric pain  Negative H pylori test  Status post fainting spell while being prepared for upper endoscopy due to extreme stress    Once again we discussed and reviewed the reason to having the upper endoscopy to evaluate further for gastric conditions i.e. ulcers, erosions polyp, mass or other conditions    He is advised to continue with the PPI and adding Carafate  - sucralfate (CARAFATE) 1 gram tablet; Take 1 tablet (1 g total) by mouth 4 (four) times a day.  Dispense: 120 tablet; Refill: 1     Patient to follow-up with having the upper endoscopy rescheduled.        Subjective:    Patient ID:   Lon Williamson is a 37 y.o. male comes in follow-up of [facility] evaluation and management of a fainting episode.  He apparently had a fainting spell while being prepared for upper endoscopy study.  He states that he became extremely anxious to a panic stage level which caused him to faint.  His recent H pylori testing returned negative and he continues to have moderate level of upper abdominal pain.  He has no other concerns today      Review of Systems  Constitutional: negative  Respiratory: negative  CV: negative  GI: See HPI  : neg   Skin: negative   Musculoskeletal:negative  Neuro/ psych: See HPI    The following patient's history were reviewed and updated as appropriate:   He  has no past medical history on file..      Outpatient Encounter Prescriptions as of 7/13/2018   Medication Sig Dispense Refill     citalopram (CELEXA) 20 MG tablet Take 1 tablet (20 mg total) by mouth daily. (Patient taking differently: Take 20 mg by mouth daily. Instructed to start with 1/2 tab (10mg) daily) 30 tablet 0     pantoprazole (PROTONIX) 40 MG tablet Take 1 tablet (40 mg total) by mouth daily. 30 tablet 1     metoclopramide (REGLAN) 10 MG tablet Take 10 mg by mouth 4 (four) times a day.       ranitidine (ZANTAC) 150 MG tablet Take 150 mg by mouth 2 (two) times a day.       No facility-administered encounter  medications on file as of 7/13/2018.          Objective:   /58 (Patient Site: Right Arm, Patient Position: Sitting, Cuff Size: Adult Regular)  Pulse (!) 102  Temp 98.1  F (36.7  C) (Oral)   Wt 176 lb 8 oz (80.1 kg)  SpO2 95%  BMI 23.29 kg/m2      Physical Exam  General Appearance:    Alert,  no acute distress, well hydrated    Eyes:    PERRL, conjunctiva/corneas clear, non icterus    Throat:   Lips, mucosa, and tongue normal;  gums normal   Neck:   Supple, symmetrical, trachea midline, no adenopathy;        thyroid:  No enlargement/tenderness/nodules; no carotid    bruit or JVD   Lungs:     Clear to auscultation bilaterally, respirations unlabored   Heart:    Regular rate and rhythm, S1 and S2 normal, no murmur, rub   or gallop   Abdomen:      Soft, mild palpable tenderness to the upper abdominal area, normal bowel sounds, no rebound or guarding, no masses, no organomegaly   Extremities:   Extremities normal, atraumatic, no cyanosis or edema   Skin:   Skin color, texture, turgor normal, no rashes or lesions

## 2021-06-19 NOTE — PROGRESS NOTES
Subjective:      Patient ID: Lon Williamson is a 37 y.o. male.    Chief Complaint:    HPI Lon Williamson is a 37 y.o. male who presents today status post fainting episode.  Patient has a history of H pylori which was treated years ago.  He had a return of his abdominal pain symptoms, so he was seen by his primary care provider, Dr. Marti, who started him on Protonix, Zantac, and Reglan.  He had already had a negative H. pylori stool test.  He was scheduled for an EGD.  When he reported to the EGD test yesterday he became very nervous and the stress caused him to pass out from fear.  He was unable to complete the test.  Today he is requesting the antibiotics to treat for H. pylori rather than undergoing the EGD test.    Patient was started on Celexa to help with stress and sleeping.  He states that it has not helped him fall asleep.  He is requesting something else.  He has been on this medication for approximately 3 days.  Patient has a follow-up appointment with Dr. Marti tomorrow.        Social History   Substance Use Topics     Smoking status: Former Smoker     Types: Cigarettes     Smokeless tobacco: Never Used     Alcohol use No       Review of Systems   Constitutional: Negative for fever.   Gastrointestinal: Positive for abdominal pain.   Psychiatric/Behavioral: Positive for sleep disturbance. The patient is nervous/anxious.        Objective:     /56 (Patient Site: Right Arm, Patient Position: Sitting, Cuff Size: Adult Regular)  Pulse 83  Temp 98.8  F (37.1  C) (Oral)   Resp 20  Wt 175 lb 9.6 oz (79.7 kg)  SpO2 97%  BMI 23.17 kg/m2    Physical Exam   Constitutional: He appears well-developed and well-nourished. No distress.   HENT:   Head: Normocephalic and atraumatic.   Right Ear: External ear normal.   Left Ear: External ear normal.   Eyes: Conjunctivae are normal.   Pulmonary/Chest: Effort normal. No respiratory distress.   Skin: He is not diaphoretic.   Psychiatric: He has a  normal mood and affect. His behavior is normal. Judgment and thought content normal.   Nursing note and vitals reviewed.    Clinical Decision Making:  I declined treating for H pylori empirically, as the patient recently had negative H. pylori stool testing.  I recommend following up with his primary care provider to discuss stress and possible alternatives to EGD.  I do not suspect there to be many alternatives.  Patient has difficulty sleeping and was recently started on citalopram.  I recommend continuing citalopram  and recommended adding melatonin.  Patient already has it at home.  He agreed to follow-up.  Since I was not doing a lot for the patient today I informed the patient that I would be no charging for this visit.     Assessment:     Procedures    1. Vaso vagal episode     2. Epigastric pain           Patient Instructions   1.  Adding melatonin to help fall sleep tonight continue taking citalopram.  2.  Follow-up with Dr. Marti tomorrow for further discussion of epigastric pain and alternatives to EGD.

## 2021-08-15 ENCOUNTER — HEALTH MAINTENANCE LETTER (OUTPATIENT)
Age: 41
End: 2021-08-15

## 2021-10-10 ENCOUNTER — HEALTH MAINTENANCE LETTER (OUTPATIENT)
Age: 41
End: 2021-10-10

## 2022-09-18 ENCOUNTER — HEALTH MAINTENANCE LETTER (OUTPATIENT)
Age: 42
End: 2022-09-18

## 2023-10-08 ENCOUNTER — HEALTH MAINTENANCE LETTER (OUTPATIENT)
Age: 43
End: 2023-10-08